# Patient Record
Sex: FEMALE
[De-identification: names, ages, dates, MRNs, and addresses within clinical notes are randomized per-mention and may not be internally consistent; named-entity substitution may affect disease eponyms.]

---

## 2017-08-26 ENCOUNTER — HEALTH MAINTENANCE LETTER (OUTPATIENT)
Age: 57
End: 2017-08-26

## 2017-10-17 ENCOUNTER — TRANSFERRED RECORDS (OUTPATIENT)
Dept: HEALTH INFORMATION MANAGEMENT | Facility: CLINIC | Age: 57
End: 2017-10-17

## 2017-10-18 ENCOUNTER — TRANSFERRED RECORDS (OUTPATIENT)
Dept: HEALTH INFORMATION MANAGEMENT | Facility: CLINIC | Age: 57
End: 2017-10-18

## 2018-01-15 ENCOUNTER — SURGERY (OUTPATIENT)
Age: 58
End: 2018-01-15
Payer: COMMERCIAL

## 2018-01-15 ENCOUNTER — ANESTHESIA (OUTPATIENT)
Dept: SURGERY | Facility: CLINIC | Age: 58
End: 2018-01-15
Payer: COMMERCIAL

## 2018-01-15 ENCOUNTER — ANESTHESIA EVENT (OUTPATIENT)
Dept: SURGERY | Facility: CLINIC | Age: 58
End: 2018-01-15
Payer: COMMERCIAL

## 2018-01-15 ENCOUNTER — HOSPITAL ENCOUNTER (OUTPATIENT)
Facility: CLINIC | Age: 58
Discharge: HOME | End: 2018-01-16
Attending: OBSTETRICS & GYNECOLOGY | Admitting: OBSTETRICS & GYNECOLOGY
Payer: COMMERCIAL

## 2018-01-15 DIAGNOSIS — N81.9 FEMALE GENITAL PROLAPSE, UNSPECIFIED TYPE: Primary | ICD-10-CM

## 2018-01-15 PROCEDURE — 37000009 ZZH ANESTHESIA TECHNICAL FEE, EACH ADDTL 15 MIN: Performed by: OBSTETRICS & GYNECOLOGY

## 2018-01-15 PROCEDURE — 25000128 H RX IP 250 OP 636: Performed by: OBSTETRICS & GYNECOLOGY

## 2018-01-15 PROCEDURE — 25000125 ZZHC RX 250: Performed by: NURSE ANESTHETIST, CERTIFIED REGISTERED

## 2018-01-15 PROCEDURE — 40000275 ZZH STATISTIC RCP TIME EA 10 MIN

## 2018-01-15 PROCEDURE — 25800025 ZZH RX 258: Performed by: OBSTETRICS & GYNECOLOGY

## 2018-01-15 PROCEDURE — 88305 TISSUE EXAM BY PATHOLOGIST: CPT | Mod: 26 | Performed by: OBSTETRICS & GYNECOLOGY

## 2018-01-15 PROCEDURE — 36000056 ZZH SURGERY LEVEL 3 1ST 30 MIN: Performed by: OBSTETRICS & GYNECOLOGY

## 2018-01-15 PROCEDURE — 71000012 ZZH RECOVERY PHASE 1 LEVEL 1 FIRST HR: Performed by: OBSTETRICS & GYNECOLOGY

## 2018-01-15 PROCEDURE — 40000306 ZZH STATISTIC PRE PROC ASSESS II: Performed by: OBSTETRICS & GYNECOLOGY

## 2018-01-15 PROCEDURE — 40000936 ZZH STATISTIC OUTPATIENT (NON-OBS) NIGHT

## 2018-01-15 PROCEDURE — 25000132 ZZH RX MED GY IP 250 OP 250 PS 637: Performed by: OBSTETRICS & GYNECOLOGY

## 2018-01-15 PROCEDURE — 71000013 ZZH RECOVERY PHASE 1 LEVEL 1 EA ADDTL HR: Performed by: OBSTETRICS & GYNECOLOGY

## 2018-01-15 PROCEDURE — 25000128 H RX IP 250 OP 636: Performed by: ANESTHESIOLOGY

## 2018-01-15 PROCEDURE — 27210794 ZZH OR GENERAL SUPPLY STERILE: Performed by: OBSTETRICS & GYNECOLOGY

## 2018-01-15 PROCEDURE — 88305 TISSUE EXAM BY PATHOLOGIST: CPT | Performed by: OBSTETRICS & GYNECOLOGY

## 2018-01-15 PROCEDURE — 36000058 ZZH SURGERY LEVEL 3 EA 15 ADDTL MIN: Performed by: OBSTETRICS & GYNECOLOGY

## 2018-01-15 PROCEDURE — 25000128 H RX IP 250 OP 636: Performed by: NURSE ANESTHETIST, CERTIFIED REGISTERED

## 2018-01-15 PROCEDURE — 40000809 ZZH STATISTIC NO DOCUMENTATION TO SUPPORT CHARGE

## 2018-01-15 PROCEDURE — 25000566 ZZH SEVOFLURANE, EA 15 MIN: Performed by: OBSTETRICS & GYNECOLOGY

## 2018-01-15 PROCEDURE — 40000935 ZZH STATISTIC OUTPATIENT (NON-OBS) EVE

## 2018-01-15 PROCEDURE — T1013 SIGN LANG/ORAL INTERPRETER: HCPCS | Mod: U3

## 2018-01-15 PROCEDURE — 94660 CPAP INITIATION&MGMT: CPT

## 2018-01-15 PROCEDURE — 37000008 ZZH ANESTHESIA TECHNICAL FEE, 1ST 30 MIN: Performed by: OBSTETRICS & GYNECOLOGY

## 2018-01-15 RX ORDER — ONDANSETRON 2 MG/ML
INJECTION INTRAMUSCULAR; INTRAVENOUS PRN
Status: DISCONTINUED | OUTPATIENT
Start: 2018-01-15 | End: 2018-01-15

## 2018-01-15 RX ORDER — ACETAMINOPHEN 500 MG
1000 TABLET ORAL EVERY 6 HOURS
Qty: 100 TABLET | Refills: 0 | COMMUNITY
Start: 2018-01-15

## 2018-01-15 RX ORDER — CELECOXIB 200 MG/1
400 CAPSULE ORAL
Status: DISCONTINUED | OUTPATIENT
Start: 2018-01-15 | End: 2018-01-15 | Stop reason: HOSPADM

## 2018-01-15 RX ORDER — OXYCODONE HYDROCHLORIDE 5 MG/1
TABLET ORAL
Qty: 30 TABLET | Refills: 0 | Status: SHIPPED | OUTPATIENT
Start: 2018-01-15

## 2018-01-15 RX ORDER — ONDANSETRON 4 MG/1
4 TABLET, ORALLY DISINTEGRATING ORAL EVERY 6 HOURS PRN
Status: DISCONTINUED | OUTPATIENT
Start: 2018-01-15 | End: 2018-01-16 | Stop reason: HOSPADM

## 2018-01-15 RX ORDER — NEOSTIGMINE METHYLSULFATE 1 MG/ML
VIAL (ML) INJECTION PRN
Status: DISCONTINUED | OUTPATIENT
Start: 2018-01-15 | End: 2018-01-15

## 2018-01-15 RX ORDER — PROPOFOL 10 MG/ML
INJECTION, EMULSION INTRAVENOUS PRN
Status: DISCONTINUED | OUTPATIENT
Start: 2018-01-15 | End: 2018-01-15

## 2018-01-15 RX ORDER — EPHEDRINE SULFATE 50 MG/ML
INJECTION, SOLUTION INTRAMUSCULAR; INTRAVENOUS; SUBCUTANEOUS PRN
Status: DISCONTINUED | OUTPATIENT
Start: 2018-01-15 | End: 2018-01-15

## 2018-01-15 RX ORDER — GLYCOPYRROLATE 0.2 MG/ML
INJECTION, SOLUTION INTRAMUSCULAR; INTRAVENOUS PRN
Status: DISCONTINUED | OUTPATIENT
Start: 2018-01-15 | End: 2018-01-15

## 2018-01-15 RX ORDER — ONDANSETRON 2 MG/ML
4 INJECTION INTRAMUSCULAR; INTRAVENOUS EVERY 30 MIN PRN
Status: DISCONTINUED | OUTPATIENT
Start: 2018-01-15 | End: 2018-01-15 | Stop reason: HOSPADM

## 2018-01-15 RX ORDER — LIDOCAINE HYDROCHLORIDE 10 MG/ML
INJECTION, SOLUTION INFILTRATION; PERINEURAL PRN
Status: DISCONTINUED | OUTPATIENT
Start: 2018-01-15 | End: 2018-01-15

## 2018-01-15 RX ORDER — PROPOFOL 10 MG/ML
INJECTION, EMULSION INTRAVENOUS CONTINUOUS PRN
Status: DISCONTINUED | OUTPATIENT
Start: 2018-01-15 | End: 2018-01-15

## 2018-01-15 RX ORDER — NALOXONE HYDROCHLORIDE 0.4 MG/ML
.1-.4 INJECTION, SOLUTION INTRAMUSCULAR; INTRAVENOUS; SUBCUTANEOUS
Status: DISCONTINUED | OUTPATIENT
Start: 2018-01-15 | End: 2018-01-16 | Stop reason: HOSPADM

## 2018-01-15 RX ORDER — HYDROMORPHONE HCL/0.9% NACL/PF 0.2MG/0.2
0.2 SYRINGE (ML) INTRAVENOUS
Status: DISCONTINUED | OUTPATIENT
Start: 2018-01-15 | End: 2018-01-16 | Stop reason: HOSPADM

## 2018-01-15 RX ORDER — OXYCODONE HYDROCHLORIDE 5 MG/1
5-10 TABLET ORAL EVERY 4 HOURS PRN
Status: DISCONTINUED | OUTPATIENT
Start: 2018-01-15 | End: 2018-01-16 | Stop reason: HOSPADM

## 2018-01-15 RX ORDER — AMOXICILLIN 250 MG
CAPSULE ORAL
Qty: 60 TABLET | Refills: 1 | COMMUNITY
Start: 2018-01-15

## 2018-01-15 RX ORDER — ONDANSETRON 2 MG/ML
4 INJECTION INTRAMUSCULAR; INTRAVENOUS EVERY 6 HOURS PRN
Status: DISCONTINUED | OUTPATIENT
Start: 2018-01-15 | End: 2018-01-16 | Stop reason: HOSPADM

## 2018-01-15 RX ORDER — PHENAZOPYRIDINE HYDROCHLORIDE 200 MG/1
200 TABLET, FILM COATED ORAL
Status: DISCONTINUED | OUTPATIENT
Start: 2018-01-15 | End: 2018-01-15 | Stop reason: HOSPADM

## 2018-01-15 RX ORDER — HYDRALAZINE HYDROCHLORIDE 20 MG/ML
2.5-5 INJECTION INTRAMUSCULAR; INTRAVENOUS EVERY 10 MIN PRN
Status: DISCONTINUED | OUTPATIENT
Start: 2018-01-15 | End: 2018-01-15 | Stop reason: HOSPADM

## 2018-01-15 RX ORDER — IBUPROFEN 600 MG/1
600 TABLET, FILM COATED ORAL EVERY 6 HOURS
Status: DISCONTINUED | OUTPATIENT
Start: 2018-01-15 | End: 2018-01-16 | Stop reason: HOSPADM

## 2018-01-15 RX ORDER — CEFAZOLIN SODIUM 1 G/3ML
1 INJECTION, POWDER, FOR SOLUTION INTRAMUSCULAR; INTRAVENOUS SEE ADMIN INSTRUCTIONS
Status: DISCONTINUED | OUTPATIENT
Start: 2018-01-15 | End: 2018-01-15 | Stop reason: HOSPADM

## 2018-01-15 RX ORDER — FENTANYL CITRATE 50 UG/ML
INJECTION, SOLUTION INTRAMUSCULAR; INTRAVENOUS PRN
Status: DISCONTINUED | OUTPATIENT
Start: 2018-01-15 | End: 2018-01-15

## 2018-01-15 RX ORDER — ACETAMINOPHEN 325 MG/1
975 TABLET ORAL EVERY 6 HOURS
Status: DISCONTINUED | OUTPATIENT
Start: 2018-01-15 | End: 2018-01-16 | Stop reason: HOSPADM

## 2018-01-15 RX ORDER — MEPERIDINE HYDROCHLORIDE 25 MG/ML
12.5 INJECTION INTRAMUSCULAR; INTRAVENOUS; SUBCUTANEOUS
Status: DISCONTINUED | OUTPATIENT
Start: 2018-01-15 | End: 2018-01-15 | Stop reason: HOSPADM

## 2018-01-15 RX ORDER — CEFAZOLIN SODIUM 2 G/100ML
2 INJECTION, SOLUTION INTRAVENOUS
Status: COMPLETED | OUTPATIENT
Start: 2018-01-15 | End: 2018-01-15

## 2018-01-15 RX ORDER — FENTANYL CITRATE 50 UG/ML
25-50 INJECTION, SOLUTION INTRAMUSCULAR; INTRAVENOUS
Status: DISCONTINUED | OUTPATIENT
Start: 2018-01-15 | End: 2018-01-15 | Stop reason: HOSPADM

## 2018-01-15 RX ORDER — ONDANSETRON 4 MG/1
4 TABLET, ORALLY DISINTEGRATING ORAL EVERY 30 MIN PRN
Status: DISCONTINUED | OUTPATIENT
Start: 2018-01-15 | End: 2018-01-15 | Stop reason: HOSPADM

## 2018-01-15 RX ORDER — IBUPROFEN 200 MG
TABLET ORAL
COMMUNITY
Start: 2018-01-15

## 2018-01-15 RX ORDER — LABETALOL HYDROCHLORIDE 5 MG/ML
10 INJECTION, SOLUTION INTRAVENOUS
Status: DISCONTINUED | OUTPATIENT
Start: 2018-01-15 | End: 2018-01-15 | Stop reason: HOSPADM

## 2018-01-15 RX ORDER — NALOXONE HYDROCHLORIDE 0.4 MG/ML
.1-.4 INJECTION, SOLUTION INTRAMUSCULAR; INTRAVENOUS; SUBCUTANEOUS
Status: DISCONTINUED | OUTPATIENT
Start: 2018-01-15 | End: 2018-01-15 | Stop reason: HOSPADM

## 2018-01-15 RX ORDER — SODIUM CHLORIDE, SODIUM LACTATE, POTASSIUM CHLORIDE, CALCIUM CHLORIDE 600; 310; 30; 20 MG/100ML; MG/100ML; MG/100ML; MG/100ML
INJECTION, SOLUTION INTRAVENOUS CONTINUOUS
Status: DISCONTINUED | OUTPATIENT
Start: 2018-01-15 | End: 2018-01-15 | Stop reason: HOSPADM

## 2018-01-15 RX ORDER — SODIUM CHLORIDE, SODIUM LACTATE, POTASSIUM CHLORIDE, CALCIUM CHLORIDE 600; 310; 30; 20 MG/100ML; MG/100ML; MG/100ML; MG/100ML
INJECTION, SOLUTION INTRAVENOUS CONTINUOUS PRN
Status: DISCONTINUED | OUTPATIENT
Start: 2018-01-15 | End: 2018-01-15

## 2018-01-15 RX ORDER — DEXAMETHASONE SODIUM PHOSPHATE 4 MG/ML
INJECTION, SOLUTION INTRA-ARTICULAR; INTRALESIONAL; INTRAMUSCULAR; INTRAVENOUS; SOFT TISSUE PRN
Status: DISCONTINUED | OUTPATIENT
Start: 2018-01-15 | End: 2018-01-15

## 2018-01-15 RX ORDER — ACETAMINOPHEN 325 MG/1
975 TABLET ORAL ONCE
Status: COMPLETED | OUTPATIENT
Start: 2018-01-15 | End: 2018-01-15

## 2018-01-15 RX ORDER — GABAPENTIN 600 MG/1
600 TABLET ORAL
Status: DISCONTINUED | OUTPATIENT
Start: 2018-01-15 | End: 2018-01-15 | Stop reason: HOSPADM

## 2018-01-15 RX ADMIN — DEXAMETHASONE SODIUM PHOSPHATE 4 MG: 4 INJECTION, SOLUTION INTRA-ARTICULAR; INTRALESIONAL; INTRAMUSCULAR; INTRAVENOUS; SOFT TISSUE at 09:39

## 2018-01-15 RX ADMIN — Medication 3 MG: at 12:25

## 2018-01-15 RX ADMIN — HYDROMORPHONE HYDROCHLORIDE 0.5 MG: 1 INJECTION, SOLUTION INTRAMUSCULAR; INTRAVENOUS; SUBCUTANEOUS at 10:18

## 2018-01-15 RX ADMIN — SODIUM CHLORIDE, POTASSIUM CHLORIDE, SODIUM LACTATE AND CALCIUM CHLORIDE: 600; 310; 30; 20 INJECTION, SOLUTION INTRAVENOUS at 09:35

## 2018-01-15 RX ADMIN — GLYCOPYRROLATE 0.4 MG: 0.2 INJECTION, SOLUTION INTRAMUSCULAR; INTRAVENOUS at 12:25

## 2018-01-15 RX ADMIN — SODIUM CHLORIDE 200 ML: 900 IRRIGANT IRRIGATION at 12:38

## 2018-01-15 RX ADMIN — FENTANYL CITRATE 50 MCG: 50 INJECTION, SOLUTION INTRAMUSCULAR; INTRAVENOUS at 10:02

## 2018-01-15 RX ADMIN — ACETAMINOPHEN 975 MG: 325 TABLET, FILM COATED ORAL at 18:11

## 2018-01-15 RX ADMIN — Medication 10 MG: at 10:44

## 2018-01-15 RX ADMIN — ONDANSETRON 4 MG: 2 INJECTION INTRAMUSCULAR; INTRAVENOUS at 12:25

## 2018-01-15 RX ADMIN — ROCURONIUM BROMIDE 10 MG: 10 INJECTION INTRAVENOUS at 10:21

## 2018-01-15 RX ADMIN — FENTANYL CITRATE 50 MCG: 50 INJECTION, SOLUTION INTRAMUSCULAR; INTRAVENOUS at 12:26

## 2018-01-15 RX ADMIN — PROPOFOL 75 MCG/KG/MIN: 10 INJECTION, EMULSION INTRAVENOUS at 09:45

## 2018-01-15 RX ADMIN — Medication 0.5 MG: at 13:47

## 2018-01-15 RX ADMIN — CEFAZOLIN SODIUM 2 G: 2 INJECTION, SOLUTION INTRAVENOUS at 09:35

## 2018-01-15 RX ADMIN — ROCURONIUM BROMIDE 40 MG: 10 INJECTION INTRAVENOUS at 09:39

## 2018-01-15 RX ADMIN — IBUPROFEN 600 MG: 600 TABLET ORAL at 18:11

## 2018-01-15 RX ADMIN — GABAPENTIN 600 MG: 600 TABLET, FILM COATED ORAL at 08:51

## 2018-01-15 RX ADMIN — PHENAZOPYRIDINE HYDROCHLORIDE 200 MG: 200 TABLET ORAL at 08:51

## 2018-01-15 RX ADMIN — MIDAZOLAM 2 MG: 1 INJECTION INTRAMUSCULAR; INTRAVENOUS at 09:35

## 2018-01-15 RX ADMIN — Medication 0.5 MG: at 15:22

## 2018-01-15 RX ADMIN — FENTANYL CITRATE 50 MCG: 50 INJECTION, SOLUTION INTRAMUSCULAR; INTRAVENOUS at 09:39

## 2018-01-15 RX ADMIN — OXYCODONE HYDROCHLORIDE 10 MG: 5 TABLET ORAL at 20:10

## 2018-01-15 RX ADMIN — PROPOFOL 150 MG: 10 INJECTION, EMULSION INTRAVENOUS at 09:39

## 2018-01-15 RX ADMIN — CELECOXIB 400 MG: 200 CAPSULE ORAL at 08:51

## 2018-01-15 RX ADMIN — GLYCOPYRROLATE 0.2 MG: 0.2 INJECTION, SOLUTION INTRAMUSCULAR; INTRAVENOUS at 09:39

## 2018-01-15 RX ADMIN — ACETAMINOPHEN 975 MG: 325 TABLET, FILM COATED ORAL at 08:51

## 2018-01-15 RX ADMIN — SODIUM CHLORIDE, POTASSIUM CHLORIDE, SODIUM LACTATE AND CALCIUM CHLORIDE: 600; 310; 30; 20 INJECTION, SOLUTION INTRAVENOUS at 10:46

## 2018-01-15 RX ADMIN — LIDOCAINE HYDROCHLORIDE 40 MG: 10 INJECTION, SOLUTION INFILTRATION; PERINEURAL at 09:39

## 2018-01-15 RX ADMIN — HYDROMORPHONE HYDROCHLORIDE 0.5 MG: 1 INJECTION, SOLUTION INTRAMUSCULAR; INTRAVENOUS; SUBCUTANEOUS at 10:02

## 2018-01-15 NOTE — OR NURSING
Unable to start capnography in PACU.  Pt on C pap with nasal mask through out PACU stay.  O2 saturations remain > 95%.

## 2018-01-15 NOTE — PLAN OF CARE
Problem: Patient Care Overview  Goal: Plan of Care/Patient Progress Review  PRIMARY DIAGNOSIS: Vag Hyst  OUTPATIENT/OBSERVATION GOALS TO BE MET BEFORE DISCHARGE:  1. Stable vital signs Temp 99.9, 93% 2L per NC, otherwise VSS  2. Tolerating diet: patient has not eaten, water tolerated, denies nausea   3. Pain controlled with oral pain medications:  patient declined pain medications at this time  4. Positive bowel sounds:  Yes, hypoactive  5. Voiding without difficulty:  No, beverly in place  6. Able to ambulate: no, patient recently arrived to floor from PACU  7. Provider specific discharge goals met:  No    Discharge Planner Nurse   Safe discharge environment identified: Yes  Barriers to discharge: Yes       Entered by: Nieves Mansfield 01/15/2018 5:03 PM     Please review provider order for any additional goals.   Nurse to notify provider when observation goals have been met and patient is ready for discharge.

## 2018-01-15 NOTE — ANESTHESIA POSTPROCEDURE EVALUATION
Patient: Mandy Martinez    Procedure(s):  Vaginal Hysterectomy,  Anterior and Posterior vaginal repairs with a cystoscopy - Wound Class: II-Clean Contaminated   - Wound Class: II-Clean Contaminated    Diagnosis:Pelvic Organ Prolapse   Diagnosis Additional Information: January 15, 2018  Surgeon: Sunita Taylor DO  Assistant: Percy Manley MD     Procedure:   1. Vaginal hysterectomy  2. Hernandez Durham culdoplasty, repair of enterocele  3. Intraperitoneal colpopexy utilizing uterosacral ligaments bilaterally  4.,  Anterior colporrhaphy  5. Posterior colpoperineorrhaphy  6.  Cystoscopy     Preoperative diagnosis:    1. Symptomatic pelvic organ prolapse  Postoperative diagnosis:    1. Symptomatic pelvic organ prolapse  Anesthesia: General  Findings: Stage 2 pel, jessica organ prolapse, bilateral ureteral spill at conclusion of procedure, bladder free of injury or foreign body   Complications: none   Estimated Blood Loss: 250 cc  Drains/Packing: Transurethral Forrester catheter        Anesthesia Type:  General, ETT    Note:  Anesthesia Post Evaluation    Patient location during evaluation: PACU  Patient participation: Able to fully participate in evaluation  Level of consciousness: awake  Pain management: adequate  Airway patency: patent  Cardiovascular status: acceptable  Respiratory status: acceptable  Hydration status: acceptable  PONV: none     Anesthetic complications: None          Last vitals:  Vitals:    01/15/18 1255 01/15/18 1300 01/15/18 1315   BP: 121/79 121/78 102/66   Pulse:      Resp: 15 12 12   Temp:      SpO2: 100% 100% 96%         Electronically Signed By: Jonah Mercer MD  January 15, 2018  1:21 PM

## 2018-01-15 NOTE — PHARMACY-ADMISSION MEDICATION HISTORY
Admission medication history interview status for this patient is complete. See [facility] admission navigator for allergy information, prior to admission medications and immunization status.     Medication history interview source(s):Patient and Family  Medication history resources (including written lists, pill bottles, clinic record):None  Primary pharmacy:-    Changes made to PTA medication list:  Added: -  Deleted: patanol eye gtts  Changed: -    Actions taken by pharmacist (provider contacted, etc):None     Additional medication history information:None    Medication reconciliation/reorder completed by provider prior to medication history? No    Do you take OTC medications (eg tylenol, ibuprofen, fish oil, eye/ear drops, etc)? no(Y/N)    For patients on insulin therapy: no (Y/N)  Lantus/levemir/NPH/Mix 70/30 dose:   (Y/N) (see Med list for doses)   Sliding scale Novolog Y/N  If Yes, do you have a baseline novolog pre-meal dose:  units with meals  Patients eat three meals a day:   Y/N    How many episodes of hypoglycemia do you have per week: _______  How many missed doses do you have per week: ______  How many times do you check your blood glucose per day: _______   Any Barriers to therapy - Be specific :  cost of medications, comfortable with giving injections (if applicable), comfortable and confident with current diabetes regimen: Y/N ______________      Prior to Admission medications    Medication Sig Last Dose Taking? Auth Provider   acetaminophen (TYLENOL) 500 MG tablet Take 2 tablets (1,000 mg) by mouth every 6 hours  Yes Sunita Taylor DO   ibuprofen (ADVIL/MOTRIN) 200 MG tablet Take 3 tablets (600 mg) every 6 hours  Yes Sunita Taylor DO   senna-docusate (SENOKOT-S;PERICOLACE) 8.6-50 MG per tablet Take 2 tablets at bedtime every night. Only hold for diarrhea  Yes Sunita Taylor DO   oxyCODONE IR (ROXICODONE) 5 MG tablet Take 5 mg for pain 4-7, take 10 mg for pain 8-10 that  is uncontrolled with acetaminophen and ibuprofen.  Yes Sunita Taylor, DO   CIPROFLOXACIN PO Take 500 mg by mouth 2 times daily Take for 10 days (1/8/18-1/18/18) not started  Reported, Patient

## 2018-01-15 NOTE — DISCHARGE SUMMARY
Physician Discharge Summary     Patient ID:  Mandy Martinez  [id number]  57 year old  1960    Admit date: 1/15/2018    Discharge date and time: 1/16/2018     Admitting Physician: Sunita Taylor DO     Discharge Physician: Sunita Taylor DO    Admission Diagnoses: Pelvic Organ Prolapse     Discharge Diagnoses: same    Admission Condition: good    Discharged Condition: good    Indication for Admission: surgical correction of pelvic organ prolapse and postoperative cares    Hospital Course: Routine postoperative care and voiding trial.     Consults: none    Significant Diagnostic Studies: postoperative labs:     Recent Labs  Lab 01/16/18  0610   HGB 12.3     Creatinine   Date Value Ref Range Status   01/16/2018 0.62 0.52 - 1.04 mg/dL Final     Treatments: Surgery: Vaginal hysterectomy, anterior colporrhaphy, posterior colpoperineorrhaphy, cystoscopy    Discharge Exam:  Stable examination, same as progress note dated today    Disposition: home    Patient Instructions:   Current Discharge Medication List      START taking these medications    Details   acetaminophen (TYLENOL) 500 MG tablet Take 2 tablets (1,000 mg) by mouth every 6 hours  Qty: 100 tablet, Refills: 0    Associated Diagnoses: Female genital prolapse, unspecified type      ibuprofen (ADVIL/MOTRIN) 200 MG tablet Take 3 tablets (600 mg) every 6 hours    Associated Diagnoses: Female genital prolapse, unspecified type      senna-docusate (SENOKOT-S;PERICOLACE) 8.6-50 MG per tablet Take 2 tablets at bedtime every night. Only hold for diarrhea  Qty: 60 tablet, Refills: 1    Associated Diagnoses: Female genital prolapse, unspecified type      oxyCODONE IR (ROXICODONE) 5 MG tablet Take 5 mg for pain 4-7, take 10 mg for pain 8-10 that is uncontrolled with acetaminophen and ibuprofen.  Qty: 30 tablet, Refills: 0    Associated Diagnoses: Female genital prolapse, unspecified type         CONTINUE these medications which have NOT CHANGED     Details   CIPROFLOXACIN PO Take 500 mg by mouth 2 times daily Take for 10 days (1/8/18-1/18/18)      PATANOL 0.1 % OP SOLN None Entered           General:  Apply ice over your incision (not directly on the skin) as needed to ease   any discomfort. Avoid rapid movements for 24 hours. You may feel dizzy after the procedure. Take care when cooking or using electrical devices. Do not apply creams, lotions, powders or hydrogen peroxide to your incision.  Keep any incisions dry and open to the air as much as possible.   Do not make any major decisions, such as signing important papers or managing legal issues, for 24 hours.   If you were sent home with a catheter and did not receive an antibiotic, contact [facility].    Cleaning: Showering is preferred, no swimming, bathtubs or soaking. You may need a   feminine pad for up to 6 weeks after surgery, bloody/yellow/white vaginal discharge is normal.     Postoperative restrictions  1.  No heavy lifting greater than 10 pounds or strenuous exercise for 6 weeks.  2.  Pelvic rest.  No douching, tampons, or intercourse for 6 weeks (estrogen vaginal cream is OK to use if prescribed)  3.  No driving while on narcotics.  Driving may be resumed initially with a competent passenger one to two weeks after surgery if no longer taking narcotics.  4.  No exercising during this period, stairs are okay.     Bowel Management: Keep your stools soft and regular, I recommend Milk of Magnesia 30ml daily for 1 to 2 weeks after surgery. Metamucil and Miralax are also very effective and should be used for up to six months after surgery(or longer).    Urinary/Bladder Management: When urinating, do not bear down, relax and allow the bladder muscle to contract. If you are unable to void, please contact me. If you go home   with a catheter, you may be given an antibiotic tablet to take before bed to retard infection.  If you were sent home with a catheter and did not receive an antibiotic, contact  the [facility].    Medications  -For moderate pain control, please use Tylenol (acetaminophen) and Motrin (ibuprofen).  -For more severe pain please use oxycodone 5 mg 1-2 every four hours as needed.  -If you are prescribed estrogen vaginal cream, please use at bedtime 2-3 times weekly until your postoperative visit.  -You may use MiraLAX, Senokot S, or Milk of Magnesia to help keep your bowels soft and regular.    Symptoms to call for  Patient has been instructed to call if a temperature greater than 101 degrees Fahrenheit, protracted nausea, vomiting, heavy vaginal bleeding greater than one pad an hour, signs or symptoms of infection, inability to urinate, or if there are questions or concerns.      Follow-up: in the clinic as instructed to have the catheter removed if you are sent home with one (the catheter is usually removed in a week unless instructed to keep it in longer).  If you have gone home without a catheter, then you will need to follow up 2 weeks from your surgery.       Signed:  Sunita Taylor  1/15/2018  9:25 AM

## 2018-01-15 NOTE — INTERVAL H&P NOTE
I reviewed her history and the planned procedure with her using a professional . There are no changes.

## 2018-01-15 NOTE — ANESTHESIA PREPROCEDURE EVALUATION
Anesthesia Evaluation     . Pt has had prior anesthetic.            ROS/MED HX    ENT/Pulmonary:     (+)sleep apnea, , . .    Neurologic:       Cardiovascular:         METS/Exercise Tolerance:     Hematologic:         Musculoskeletal:   (+) arthritis, , , -       GI/Hepatic:         Renal/Genitourinary:         Endo:         Psychiatric:         Infectious Disease:         Malignancy:         Other:                     Physical Exam  Normal systems: cardiovascular and pulmonary    Airway   Mallampati: II  TM distance: >3 FB  Neck ROM: full    Dental     Cardiovascular       Pulmonary                     Anesthesia Plan      History & Physical Review  History and physical reviewed and following examination; no interval change.    ASA Status:  2 .    NPO Status:  > 8 hours    Plan for General and ETT with Intravenous and Propofol induction. Maintenance will be Balanced.    PONV prophylaxis:  Ondansetron (or other 5HT-3) and Dexamethasone or Solumedrol       Postoperative Care  Postoperative pain management:  IV analgesics.      Consents  Anesthetic plan, risks, benefits and alternatives discussed with:  Patient.  Use of blood products discussed: Yes.   .                          .

## 2018-01-15 NOTE — PROGRESS NOTES
ROOM # 231    Living Situation (if not independent, order SW consult): Patient lives independently with daughter.  Facility name: N/A  : patient declines to offer     Activity level at baseline: Independent  Activity level on admit: Patient to floor from PACU, lethargic, will assess.        Patient registered to observation; given Patient Bill of Rights; given the opportunity to ask questions about observation status and their plan of care.  Patient has been oriented to the observation room, bathroom and call light is in place.    Discussed discharge goals and expectations with patient/family.

## 2018-01-15 NOTE — IP AVS SNAPSHOT
MRN:[id number]                      After Visit Summary   1/15/2018    Mandy Martinez    MRN: [id number]           Thank you!     Thank you for choosing [facility] for your care. Our goal is always to provide you with excellent care. Hearing back from our patients is one way we can continue to improve our services. Please take a few minutes to complete the written survey that you may receive in the mail after you visit. If you would like to speak to someone directly about your visit please contact Patient Relations at [phone]. Thank you!          Patient Information     Date Of Birth          1960        About your hospital stay     You were admitted on:  January 15, 2018 You last received care in the:  [facility] Observation Department    You were discharged on:  January 16, 2018       Who to Call     For medical emergencies, please call 911.  For non-urgent questions about your medical care, please call your primary care provider or clinic, [phone]  For questions related to your surgery, please call your surgery clinic        Attending Provider     Provider Sunita Villeda,  OB/Gyn       Primary Care Provider Office Phone # Fax #    Mandy Sahni PA-C [phone]      After Care Instructions     Discharge Instructions       General:  Apply ice over your incision (not directly on the skin) as needed to ease   any discomfort. Avoid rapid movements for 24 hours. You may feel dizzy after the procedure. Take care when cooking or using electrical devices. Do not apply creams, lotions, powders or hydrogen peroxide to your incision.  Keep any incisions dry and open to the air as much as possible.   Do not make any major decisions, such as signing important papers or managing legal issues, for 24 hours.   If you were sent home with a catheter and did not receive an antibiotic, contact [facility].    Cleaning: Showering  is preferred, no swimming, bathtubs or soaking. You may need a   feminine pad for up to 6 weeks after surgery, bloody/yellow/white vaginal discharge is normal.     Postoperative restrictions  1.  No heavy lifting greater than 10 pounds or strenuous exercise for 6 weeks.  2.  Pelvic rest.  No douching, tampons, or intercourse for 6 weeks (estrogen vaginal cream is OK to use if prescribed)  3.  No driving while on narcotics.  Driving may be resumed initially with a competent passenger one to two weeks after surgery if no longer taking narcotics.  4.  No exercising during this period, stairs are okay.     Bowel Management: Keep your stools soft and regular, I recommend Milk of Magnesia 30ml daily for 1 to 2 weeks after surgery. Metamucil and Miralax are also very effective and should be used for up to six months after surgery(or longer).    Urinary/Bladder Management: When urinating, do not bear down, relax and allow the bladder muscle to contract. If you are unable to void, please contact me. If you go home   with a catheter, you may be given an antibiotic tablet to take before bed to retard infection.  If you were sent home with a catheter and did not receive an antibiotic, contact the [facility] Nurse.    Medications  -For moderate pain control, please use Tylenol (acetaminophen) and Motrin (ibuprofen).  -For more severe pain please use oxycodone 5 mg 1-2 every four hours as needed.  -If you are prescribed estrogen vaginal cream, please use at bedtime 2-3 times weekly until your postoperative visit.  -You may use MiraLAX, Senokot S, or Milk of Magnesia to help keep your bowels soft and regular.    Symptoms to call for  Patient has been instructed to call if a temperature greater than 101 degrees Fahrenheit, protracted nausea, vomiting, heavy vaginal bleeding greater than one pad an hour, signs or symptoms of infection, inability to urinate, or if there are questions or concerns.      Follow-up: in the clinic as  "instructed to have the catheter removed if you are sent home with one (the catheter is usually removed in a week unless instructed to keep it in longer).  If you have gone home without a catheter, then you will need to follow up 2 weeks from your surgery.                             Pending Results     Date and Time Order Name Status Description    1/15/2018 1214 Surgical pathology exam In process             Admission Information     Date & Time Provider Department Dept. Phone    1/15/2018 Sunita Taylor,  [facility] Observation Department [phone]      Your Vitals Were     Blood Pressure Pulse Temperature Respirations Height Weight    104/54 (BP Location: Left arm) 59 97.5  F (36.4  C) (Oral) 18 1.575 m (5' 2\") 65.8 kg (145 lb)    Pulse Oximetry BMI (Body Mass Index)                92% 26.52 kg/m2          [employer] Information     [employer] lets you send messages to your doctor, view your test results, renew your prescriptions, schedule appointments and more. To sign up, go to [email or web address] . Click on \"Log in\" on the left side of the screen, which will take you to the Welcome page. Then click on \"Sign up Now\" on the right side of the page.     You will be asked to enter the access code listed below, as well as some personal information. Please follow the directions to create your username and password.     Your access code is: [email or web address]  Expires: 2018 10:52 AM     Your access code will  in 90 days. If you need help or a new code, please call your [facility] clinic or [phone].        Care EveryWhere ID     This is your Care EveryWhere ID. This could be used by other organizations to access your [facility] medical records  [id number]        Equal Access to Services     HIWOT JACOME : Kennedy Rice, kayyln mayes, hero guerrero. So [facility] [phone].    ATENCIÓN: Si pushpala español, jacquie a marcus " disposición servicios gratuitos de asistencia lingüística. Melchor silver [phone].    We comply with applicable federal civil rights laws and [place] laws. We do not discriminate on the basis of race, color, national origin, age, disability, sex, sexual orientation, or gender identity.               Review of your medicines      START taking        Dose / Directions    acetaminophen 500 MG tablet   Commonly known as:  TYLENOL   Used for:  Female genital prolapse, unspecified type        Dose:  1000 mg   Take 2 tablets (1,000 mg) by mouth every 6 hours   Quantity:  100 tablet   Refills:  0       ibuprofen 200 MG tablet   Commonly known as:  ADVIL/MOTRIN   Used for:  Female genital prolapse, unspecified type        Take 3 tablets (600 mg) every 6 hours   Refills:  0       nitroFURantoin (macrocrystal-monohydrate) 100 MG capsule   Commonly known as:  MACROBID   Used for:  Female genital prolapse, unspecified type        Dose:  100 mg   Take 1 capsule (100 mg) by mouth At Bedtime Only take while you have the Forrester bladder catheter in   Quantity:  20 capsule   Refills:  0       oxyCODONE IR 5 MG tablet   Commonly known as:  ROXICODONE   Used for:  Female genital prolapse, unspecified type        Take 5 mg for pain 4-7, take 10 mg for pain 8-10 that is uncontrolled with acetaminophen and ibuprofen.   Quantity:  30 tablet   Refills:  0       senna-docusate 8.6-50 MG per tablet   Commonly known as:  SENOKOT-S;PERICOLACE   Used for:  Female genital prolapse, unspecified type        Take 2 tablets at bedtime every night. Only hold for diarrhea   Quantity:  60 tablet   Refills:  1         CONTINUE these medicines which have NOT CHANGED        Dose / Directions    CIPROFLOXACIN PO        Dose:  500 mg   Take 500 mg by mouth 2 times daily Take for 10 days (1/8/18-1/18/18)   Refills:  0            Where to get your medicines      These medications were sent to [place], [place] - [address]  [address] [facility] [address]     Phone:  [phone]     nitroFURantoin (macrocrystal-monohydrate) 100 MG capsule         Some of these will need a paper prescription and others can be bought over the counter. Ask your nurse if you have questions.     Bring a paper prescription for each of these medications     oxyCODONE IR 5 MG tablet       You don't need a prescription for these medications     acetaminophen 500 MG tablet    ibuprofen 200 MG tablet    senna-docusate 8.6-50 MG per tablet               ANTIBIOTIC INSTRUCTION     You've Been Prescribed an Antibiotic - Now What?  Your healthcare team thinks that you or your loved one might have an infection. Some infections can be treated with antibiotics, which are powerful, life-saving drugs. Like all medications, antibiotics have side effects and should only be used when necessary. There are some important things you should know about your antibiotic treatment.      Your healthcare team may run tests before you start taking an antibiotic.    Your team may take samples (e.g., from your blood, urine or other areas) to run tests to look for bacteria. These test can be important to determine if you need an antibiotic at all and, if you do, which antibiotic will work best.      Within a few days, your healthcare team might change or even stop your antibiotic.    Your team may start you on an antibiotic while they are working to find out what is making you sick.    Your team might change your antibiotic because test results show that a different antibiotic would be better to treat your infection.    In some cases, once your team has more information, they learn that you do not need an antibiotic at all. They may find out that you don't have an infection, or that the antibiotic you're taking won't work against your infection. For example, an infection caused by a virus can't be treated with antibiotics. Staying on an antibiotic when you don't need it  is more likely to be harmful than helpful.      You may experience side effects from your antibiotic.    Like all medications, antibiotics have side effects. Some of these can be serious.    Let you healthcare team know if you have any known allergies when you are admitted to the hospital.    One significant side effect of nearly all antibiotics is the risk of severe and sometimes deadly diarrhea caused by Clostridium difficile (C. Difficile). This occurs when a person takes antibiotics because some good germs are destroyed. Antibiotic use allows C. diificile to take over, putting patients at high risk for this serious infection.    As a patient or caregiver, it is important to understand your or your loved one's antibiotic treatment. It is especially important for caregivers to speak up when patients can't speak for themselves. Here are some important questions to ask your healthcare team.    What infection is this antibiotic treating and how do you know I have that infection?    What side effects might occur from this antibiotic?    How long will I need to take this antibiotic?    Is it safe to take this antibiotic with other medications or supplements (e.g., vitamins) that I am taking?     Are there any special directions I need to know about taking this antibiotic? For example, should I take it with food?    How will I be monitored to know whether my infection is responding to the antibiotic?    What tests may help to make sure the right antibiotic is prescribed for me?      Information provided by:  www.cdc.gov/getsmart  U.S. Department of Health and Human Services  Centers for disease Control and Prevention  National Center for Emerging and Zoonotic Infectious Diseases  Division of Healthcare Quality Promotion         Protect others around you: Learn how to safely use, store and throw away your medicines at www.disposemymeds.org.             Medication List: This is a list of all your medications and when to  take them. Check marks below indicate your daily home schedule. Keep this list as a reference.      Medications           Morning Afternoon Evening Bedtime As Needed    acetaminophen 500 MG tablet   Commonly known as:  TYLENOL   Take 2 tablets (1,000 mg) by mouth every 6 hours   Last time this was given:  975 mg on 1/16/2018  1:28 PM                                CIPROFLOXACIN PO   Take 500 mg by mouth 2 times daily Take for 10 days (1/8/18-1/18/18)                                ibuprofen 200 MG tablet   Commonly known as:  ADVIL/MOTRIN   Take 3 tablets (600 mg) every 6 hours   Last time this was given:  600 mg on 1/16/2018  6:03 AM                                nitroFURantoin (macrocrystal-monohydrate) 100 MG capsule   Commonly known as:  MACROBID   Take 1 capsule (100 mg) by mouth At Bedtime Only take while you have the Forrester bladder catheter in                                oxyCODONE IR 5 MG tablet   Commonly known as:  ROXICODONE   Take 5 mg for pain 4-7, take 10 mg for pain 8-10 that is uncontrolled with acetaminophen and ibuprofen.   Last time this was given:  5 mg on 1/16/2018  4:59 PM                                senna-docusate 8.6-50 MG per tablet   Commonly known as:  SENOKOT-S;PERICOLACE   Take 2 tablets at bedtime every night. Only hold for diarrhea                                          More Information        Cuidado De La Sonda De Forrester [Forrester Catheter Care]    Dilia sonda de Forrester es un tubo de goma que se inserta en la uretra (el orificio por el que sale la orina) hasta la vejiga. Eso ayuda a eliminar la orina de la vejiga. Hay un pequeño globo en el extremo del tubo que se infla dilia vez que el tubo fue insertado. Eso impide que la sonda se salga de la vejiga.  Dilia sonda de Forrester se usa para tratar la retención urinaria (urinary retention) [cuando la persona no puede orinar]. También se la utiliza cuando hay incontinencia (incontinence) [pérdida del control de la vejiga].  Cuidados En La  Casa:  1. Termine de mae el antibiótico (antibiotic) que le hayan recetado incluso aunque se sienta mejor antes de terminarlo.  2. Es importante evitar que las bacterias ingresen en la bolsa colectora. No desconecte la sonda de la bolsa colectora.  3. Emplee deisy alcaraz en la pierna para sujetar el tubo de vaciado, de modo alhaji que no tire de la sonda. Cuando la bolsa colectora esté llena, vacíela empleando la boquilla de vaciado que se encuentra en la parte inferior de la bolsa.  4. No tire de la sonda ni intente quitársela. Se lastimará la uretra si lo hace. Tiene que quitársela un médico o deisy enfermera.  Visita De Control:  Programe deisy visita de control con marcus médico, o según le hayan indicado, para que le marcelina otra prueba de orina (urine testing) y para que le cambien o le quiten la sonda.  Busque Prontamente Atención Médica  si algo de lo siguiente ocurre:    Fiebre de 100.4 F (38 C) o más rebekah, o segundo le haya indicado marcus proveedor de atención médica.    Dolor en la vejiga o sensación de que  está llena .    Hinchazón abdominal, náuseas o vómito, o dolor en la espalda.    Alhaji o pérdida de orina alrededor de la sonda.    Alhaji en la orina que sale de la sonda (si es un síntoma nuevo).    La sonda se sale.    La sonda chase de drenar jorge 6 horas.    Debilidad, mareo o desmayo.  Date Last Reviewed: 3/10/2014    2650-6708 [employer]. [address] [place] [address], [place] [address]. Todos los derechos reservados. Esta información no pretende sustituir la atención médica profesional. Sólo marcus médico puede diagnosticar y tratar un problema de sergio.                Instrucciones de rebekah catéter urinario permanente   Usted ha sido dado de rebekah con un catéter urinario permanente (también llamado sonda o catéter de Ofrrester). Un catéter es un tubo haney y flexible. Un catéter urinario permanente tiene dos partes. La primera parte es un tubo que drena la orina de marcus vejiga. La segunda parte es deisy bolsa u  otro dispositivo que recoge la orina.  Lo más importante es recordar que usted debe prevenir deisy infección. Lávese siempre las giana antes de tocar la bolsa o los tubos de marcus catéter.  Cuidados en la casa  Cómo drenar la bolsa:    Lávese las giana.    Agarre el tubo de drenaje y ubíquelo de manera que quede por encima de un inodoro o de un recipiente de medición.    Afloje la pinza o abrazadera del tubo.    No toque la punta del tubo de drenaje ni deje que toque el inodoro o el recipiente.  Limpieza del tubo de drenaje:    Cuando la bolsa esté vacía, limpie la punta del tubo de drenaje con deisy toallita impregnada en alcohol.    Apriete la pinza o abrazadera sobre el tubo.    Vuelva a insertar el tubo en el hueco de la bolsa de drenaje.  Cómo limpiar marcus piel y los tubos:    Limpie la piel cercana al catéter con jabón y agua.    Lávese el área genital de adelante hacia atrás.    Lave los tubos del catéter. Siempre lave el catéter en dirección hacia afuera de marcus cuerpo.    Se le dirá cuándo y cómo cambiar marcus bolsa y kya tubos.    No trate de quitarse el catéter usted solo.    Usted puede bañarse con el catéter permanente.  Cómo vaciar deisy bolsa pegada a la pierna:    Lávese las giana.    Quite el tapón de la bolsa.    Drene la bolsa en el inodoro o en un recipiente medidor.  No permita que la punta del tubo de drenaje toque ningún objeto, incluso kya dedos.    Limpie la punta del tubo de drenaje con deisy toallita impregnada en alcohol.    Ponga de nuevo el tapón.  Visitas de control    Programe deisy visita de control según le indique el personal médico.     Cuándo debe llamar al médico  Llame al médico inmediatamente si usted presenta alguno de estos síntomas:    Fiebre de más de 100.0  F    Escalofríos    Escape de líquido en el área de inserción del catéter    Espasmos que aumentan (contracciones incontrolables) de las piernas, el abdomen o la vejiga (los espasmos ocasionales y leves son normales)    Sensación de ardor  en las vías urinarias, el pene o el área genital    Náuseas y vómito    Dolor en la parte inferior de la espalda    Orina turbia    Sedimento o moco en la orina    Orina con izzy (rosada o jeremías) o maloliente   Date Last Reviewed: 9/24/2014 2000-2017 [employer]. [address] [place] [address]. Todos los derechos reservados. Esta información no pretende sustituir la atención médica profesional. Sólo marcus médico puede diagnosticar y tratar un problema de sergio.                Instrucciones de rebekah: cómo cuidar marcus bolsa de pierna  Usted se va a marcus casa con un catéter urinario y un dispositivo recolector (bolsa de drenaje) puestos. Deisy clase de dispositivo recolector se llama bolsa de pierna. Deisy bolsa de pierna es deisy bolsa pequeña de drenaje que usted puede usar en marcus pierna jorge el día para recolectar la orina. La bolsa se puede colocar debajo de la ropa. Usted se puede  con mucha facilidad cuando usa deisy bolsa de pierna en lugar de deisy bolsa recolectora más oly.  A usted le mostraron cómo cuidar marcus catéter en el hospital. Esta hoja de información le ayudará a recordar esos pasos cuando usted esté en marcus casa.  Cuidados en la casa    Lávese muy vernon las giana antes y después de manipular marcus catéter o marcus dispositivo recolector.    Reúna los elementos necesarios:    Toallitas impregnadas en alcohol    Agua y jabón    Toalla y paño para lavarse    Alcaraz de sujeción para la pierna y bolsa de pierna    Use agua y jabón para lavarse la nayan donde el catéter entra en marcus cuerpo. Enjuáguese vernon.    Fije el sostenedor de la bolsa a marcus pierna:    Coloque la alcaraz de la pierna en la parte superior del muslo con la etiqueta del producto hacia afuera de marcus pierna.    Estire la alcaraz de la pierna hasta que quede vernon puesta y ajústela.    Coloque el tubo del catéter sobre la bolsa y fíjelo. Lo puede fijar con deisy leon de Velcro o algún otro método según el producto que use. Asegúrese de  dejar la suficiente cantidad de catéter por encima de la alcaraz de pierna para evitar que el tubo se desprenda.    Cambie de lugar la alcaraz cada 4 o 6 horas para prevenir la presión del elástico en la pierna. Usted puede hacer esto cambiando de pierna la bolsa, o subiendo o bajando la alcaraz en la pierna.    Lave la alcaraz con la frecuencia que necesite. La alcaraz de pierna se puede michael y secar a mano.    Coloque la bolsa en el sostenedor de la bolsa.    Limpie el extremo de la bolsa de orina del catéter y el adaptador del catéter con deisy toallita impregnada con alcohol.    Coloque deisy toalla debajo de la bolsa y la válvula de entrada para evitar que la orina gotee sobre marcus pierna.    Asegúrese de que la válvula de salida en la parte inferior de la bolsa esté cerrada firmemente antes de conectarla al catéter. Simplemente empuje la válvula hacia arriba en dirección de la bolsa hasta que se ajuste firmemente en marcus lugar. Asegúrese de no tirar del tubo. Hágalo con cuidado.    Conecte la bolsa de orina a la punta del catéter de manera que ésta entre cómodamente en la válvula de entrada del catéter. Es posible evitar el goteo de orina al doblar el tubo del catéter bob debajo de la punta y sostenerlo mientras lo desconecta del catéter. Asegúrese de tener la punta limpia mientras conecta el tubo de la bolsa de pierna al catéter; esto previene que entren gérmenes al sistema.    Vacíe la bolsa cuando esté llena. Para vaciar la bolsa, gire la pinza o abrazadera hacia abajo. El tubo flexible de salida puede dirigirse para controlar el flujo de orina. Usted no tiene que desconectar la bolsa de pierna del catéter para vaciarla. Puede alcanzar fácilmente la bolsa de pierna al levantar marcus pierna en el borde del inodoro. Luego, puede vaciar la bolsa directamente en el inodoro. [place] le evitará agacharse, lo que puede ser incómodo.    Mantenga la bolsa limpia. Enjuáguela todos los días con partes iguales de agua y vinagre para  reducir el olor y mantener la bolsa tremaine de gérmenes.      Recuerde mantener la bolsa por debajo del nivel de marcus vejiga para que haya un drenaje adecuado.  Visitas de control  Programe deisy visita de control según le indique marcus proveedor de atención médica.      Cuándo debe llamar a marcsu proveedor de atención médica  Llame a marcus proveedor de inmediato si nota que tiene cualquiera de estos síntomas:    Enrojecimiento, hinchazón o calor alrededor del lugar de entrada del catéter    Pus que drena de la entrada de marcus catéter o dentro del tubo del catéter y la bolsa    Alhaji, coágulos o desechos que flotan en la orina    Náuseas y vómitos    Escalofríos temblorosos    Fiebre por encima de 100.4  F (38  C)    Dolor que no se manny con los medicamentos    El catéter se  o se desplaza   Date Last Reviewed: 2014-2017 [employer]. [address] [place] [address]. Todos los derechos reservados. Esta información no pretende sustituir la atención médica profesional. Sólo marcus médico puede diagnosticar y tratar un problema de sergio.                Cómo vaciar y limpiar la bolsa de marcus catéter urinario  Usted tiene puesto un catéter permanente que drena la orina de marcus vejiga a deisy bolsa. La bolsa puede colocarse al lado de marcus cama o ser más pequeña y estar adherida a marcus pierna. Siga los pasos que se enumeran a continuación para vaciar y limpiar deisy bolsa de catéter.       Vacíe la bolsa Limpie el tubo de drenaje Limpie el tubo del catéter   1. Vacíe la bolsa    Lave vernon kya giana con jabón y agua para prevenir la contaminación del catéter urinario y la bolsa.    Si el tubo corto de drenaje está introducido en un bolsillo de la superficie de la bolsa, sáquelo de ward bolsillo.    Sostenga el tubo de drenaje sobre el inodoro o sobre el recipiente de medición de orina. [place] la válvula.    No toque el extremo de la válvula ni permita que éste toque el inodoro o el recipiente.    Lave kya giana otra  vez.  2. Limpie el tubo de drenaje    Deisy vez que haya vaciado la orina de la bolsa, limpie el extremo de la válvula de drenaje con deisy toallita mojada con alcohol.    Cierre la válvula.    Vuelva a introducir el tubo de drenaje dentro del bolsillo de la bolsa, si lo hay.   3. Limpie marcus piel    Lave vernon kya giana antes y después de limpiar marcus piel.    Si tiene un catéter introducido a través de la uretra (segundo deisy sonda Forrester), limpie la nayan de la uretra con jabón y agua 1 vez cada día alhaji segundo le enseñó marcus proveedor de atención médica.También debe limpiarse después de cada evacuación para prevenir las infecciones.    No tire del tubo mientras lo limpia, para evitar que se lesione la uretra.    No aplique pomadas antibióticas ni productos antibacterianos de ningún tipo en la uretra.    No use lubricantes en la uretra.    No aplique polvos ni talco en la nayan genital ni en el tubo.    Si tiene un catéter suprapúbico (es decir, un catéter que le leon colocado quirúrgicamente en la vejiga a través de la parte inferior del abdomen), marcus proveedor de atención médica le dirá cómo debe limpiar la piel alrededor de iesha.  4. Revise y limpie el tubo del catéter    Revise el tubo. Si tiene dobleces, grietas u obstrucciones, o no puede camille marcus interior, deberá cambiarlo por un tubo nuevo alhaji segundo le enseñó marcus proveedor de atención médica.    Si el tubo actual está todavía en buenas condiciones, lávelo con jabón y agua. Al michael el tubo use siempre un movimiento desde el cuerpo hacia afuera (no hacia el cuerpo). No tire del tubo (no lo jale).    Seque el tubo con deisy servilleta de papel limpia o un trapo limpio.  5. Limpie la bolsa de la orina    Limpie la bolsa de la orina deisy vez cada 3 días.    Tenga preparada deisy bolsa de recambio u otro dispositivo de drenaje.    Siga estos pasos:    Laves vernon kya giana con agua y jabón.    Desconecte el tubo de la bolsa y conéctelo a la bolsa de recambio o al dispositivo de  drenaje.    Vacíe toda la orina de la bolsa que acaba de desconectar. Cierre la válvula de drenaje.    Ponga un poco de agua jabonosa en la bolsa. Agite un poco la bolsa, asegurándose de que el jabón llegue a todas kya esquinas.    Lowell la válvula de drenaje para vaciar el agua jabonosa. Cierre la válvula.    Llene la bolsa con 2 partes de vinagre y 3 partes de agua. Agite un poco la mezcla y déjela en la bolsa jroge 30 minutos.    Vacíe la mezcla de agua con vinagre y enjuague la bolsa con agua fría.    Cuelgue la bolsa para que se drene vernon y se seque al aire.  Cuándo debe llamar al médico  Llame a marcus médico de inmediato si tiene cualquiera de estos síntomas:    Anderson o ninguna orina fluye hacia la bolsa.    Hay pérdida de orina en el sitio donde el catéter entra en el cuerpo.    Dolor, ardor o enrojecimiento en la nayan donde el catéter entra en el cuerpo.    Izzy en la orina (un pequeño rastro de izzy es normal).    La orina está turbia o maloliente, o contiene partículas segundo granos de arena.    Dolor en la parte inferior de la espalda o del abdomen.    El catéter se .    Fiebre de 100.4 F  (38  C) o superior, o escalofríos temblorosos.   Date Last Reviewed: 2014-2017 [employer]. [address] [place] [address], [place] [address]. Todos los derechos reservados. Esta información no pretende sustituir la atención médica profesional. Sólo marcus médico puede diagnosticar y tratar un problema de sergio.

## 2018-01-15 NOTE — ANESTHESIA CARE TRANSFER NOTE
Patient: Mandy Martinez    Procedure(s):  Vaginal Hysterectomy,  Anterior and Posterior vaginal repairs with a cystoscopy - Wound Class: II-Clean Contaminated   - Wound Class: II-Clean Contaminated    Diagnosis: Pelvic Organ Prolapse   Diagnosis Additional Information: No value filed.    Anesthesia Type:   General, ETT     Note:    Patient transferred to:PACU  Comments: PT spont resps ETT removed to [facility] VSS Report to RNHandoff Report: Identifed the Patient, Identified the Reponsible Provider, Reviewed the pertinent medical history, Discussed the surgical course, Reviewed Intra-OP anesthesia mangement and issues during anesthesia, Set expectations for post-procedure period and Allowed opportunity for questions and acknowledgement of understanding      Vitals: (Last set prior to Anesthesia Care Transfer)    CRNA VITALS  1/15/2018 1210 - 1/15/2018 1246      1/15/2018             Pulse: 98    SpO2: 96 %                Electronically Signed By: Dean Dennis Severson, APRN CRNA  January 15, 2018  12:46 PM

## 2018-01-15 NOTE — OP NOTE
Mandy Martinez  January 15, 2018  Surgeon: Sunita Taylor DO  Assistant: Percy Manley MD    Procedure:   1. Vaginal hysterectomy  2. [facility] culdoplasty, repair of enterocele  3. Intraperitoneal colpopexy utilizing uterosacral ligaments bilaterally  4. Anterior colporrhaphy  5. Posterior colpoperineorrhaphy  6.  Cystoscopy    Preoperative diagnosis:    1. Symptomatic pelvic organ prolapse  Postoperative diagnosis:    1. Symptomatic pelvic organ prolapse  Anesthesia: General  Findings: Stage 2 pelvic organ prolapse, bilateral ureteral spill at conclusion of procedure, bladder free of injury or foreign body   Complications: none   Estimated Blood Loss: 250 cc  Drains/Packing: Transurethral Forrester catheter     Procedure:  The patient was identified and brought to the operating room. Anesthesia was introduced. The patient was prepped and draped in the usual sterile fashion in the dorsal lithotomy position with legs in candy-cane stirrups. A surgical pause/time out was completed. Bimanual examination noted no contraindications to proceeding with surgery.   A weighted speculum was placed in the posterior vagina. Two tenacula were placed on the anterior and posterior lips of the cervix. A circumferential incision was made about the cervix and the anterior and posterior cul-de-sacs entered without difficulty. The uterosacral, cardinal, and uterine vascular pedicles were sequentially clamped, cut, and suture ligated, being palpably free of the ureters bilaterally. The uterine fundus was inverted and the utero-ovarian pedicles clamped, cut and suture ligated, delivering the specimen.   A pack was placed to elevate the bowel away from the operative field.  The patient had a previous tubal ligation and her tubal segment that was present was visible and high. Decision was made to leave in situ. All pedicles were evaluated and hemostasis verified.    At this point in time an enterocele was noted. Repair of the  enterocele along with the suspension of the vaginal cuff was accomplished together by placement of two modified Durham sutures of number 1 Vicryl, utilizing the uterosacral ligament for suspension. A reperitonealizing stitch was placed. These stitches were placed and tagged.   Cystoscopy was performed and the ureters were peristalsising but no obvious spill was noted. Decision was made to cut that stitch and cystoscopy showed bilateral ureteral spill of very clear urine - I believe they were spilling previously but too clear to see the urine, even with the pyridium. Cystoscopy was performed after tying each remaining tagged suture, and evaluation of the bladder did not reveal any injury. Bilateral ureteral spill was visualized with each stitch tied. The vaginal vault was then closed with a number 1 Vicryl in an interrupted manner, suture ligating the ligamentous pedicles into the corners of the vault bilaterally.    An anterior colporrhaphy was begun by undermining a midline portion of anterior vaginal mucosa from the vaginal apex to beneath the bladder neck. The musoca was developed off of the underlying connective tissue bilaterally. An initial layer of running 2-0 Vicryl was then used to plicate the central defect and imbricate the redundancy in the midline. Having reduced that out of the way, a second layer now of number 1 Vicryl was used to plicate the endopelvic fascia from the bladder neck all the way to the vaginal apex, nicely securing the pubocervical fascial ring at the apex. The redundant anterior vaginal epithelium was then excised and the edges reapproximated with interrupted 2-0 Vicryl.   The tagged apical support sutures were all tied and gave excellent apical support.   Cystoscopy was performed, and evaluation of the bladder did not reveal any injury. Bilateral ureteral spill was visualized. The vaginal vault was then closed with a number 1 Vicryl in an interrupted manner, suture ligating the  ligamentous pedicles into the corners of the vault bilaterally.  A posterior colporrhaphy with perineorrhaphy was then begun by taking a wedge resection of of the perineal body and undermining the midline portion of posterior vaginal mucosa from the perineal body all the way to the vaginal cuff.  Utilizing interrupted number 1 vicryl, the mucosa and underlying endopelvic fascia were plicated in the midline, giving excellent support along the posterior compartment. The perineal skin was closed using a 2-0 vicryl. A vaginal examination revealed a normal vaginal caliber and depth without any constriction.    An Forrester catheter and vaginal packing was placed. All sponge, lap, needle and instrument counts were correct according to nursing.  The patient was awakened and taken to the recovery room in stable condition.  Estimated blood loss was 250 cc

## 2018-01-15 NOTE — IP AVS SNAPSHOT
[facility] Observation Department    [address]    [facility] [address]    Phone:  [phone]                                       After Visit Summary   1/15/2018    Mandy Martinez    MRN: [id number]           After Visit Summary Signature Page     I have received my discharge instructions, and my questions have been answered. I have discussed any challenges I see with this plan with the nurse or doctor.    ..........................................................................................................................................  Patient/Patient Representative Signature      ..........................................................................................................................................  Patient Representative Print Name and Relationship to Patient    ..................................................               ................................................  Date                                            Time    ..........................................................................................................................................  Reviewed by Signature/Title    ...................................................              ..............................................  Date                                                            Time

## 2018-01-16 ENCOUNTER — OFFICE VISIT (OUTPATIENT)
Dept: INTERPRETER SERVICES | Facility: CLINIC | Age: 58
End: 2018-01-16
Payer: COMMERCIAL

## 2018-01-16 VITALS
WEIGHT: 145 LBS | BODY MASS INDEX: 26.68 KG/M2 | OXYGEN SATURATION: 92 % | SYSTOLIC BLOOD PRESSURE: 104 MMHG | TEMPERATURE: 97.5 F | HEIGHT: 62 IN | HEART RATE: 59 BPM | DIASTOLIC BLOOD PRESSURE: 54 MMHG | RESPIRATION RATE: 18 BRPM

## 2018-01-16 LAB
COPATH REPORT: NORMAL
CREAT SERPL-MCNC: 0.62 MG/DL (ref 0.52–1.04)
GFR SERPL CREATININE-BSD FRML MDRD: >90 ML/MIN/1.7M2
GLUCOSE SERPL-MCNC: 122 MG/DL (ref 70–99)
HGB BLD-MCNC: 12.3 G/DL (ref 11.7–15.7)

## 2018-01-16 PROCEDURE — 82565 ASSAY OF CREATININE: CPT | Performed by: OBSTETRICS & GYNECOLOGY

## 2018-01-16 PROCEDURE — 25000132 ZZH RX MED GY IP 250 OP 250 PS 637: Performed by: OBSTETRICS & GYNECOLOGY

## 2018-01-16 PROCEDURE — 82947 ASSAY GLUCOSE BLOOD QUANT: CPT | Performed by: OBSTETRICS & GYNECOLOGY

## 2018-01-16 PROCEDURE — 85018 HEMOGLOBIN: CPT | Performed by: OBSTETRICS & GYNECOLOGY

## 2018-01-16 PROCEDURE — T1013 SIGN LANG/ORAL INTERPRETER: HCPCS | Mod: U3

## 2018-01-16 PROCEDURE — 36415 COLL VENOUS BLD VENIPUNCTURE: CPT | Performed by: OBSTETRICS & GYNECOLOGY

## 2018-01-16 RX ORDER — NITROFURANTOIN 25; 75 MG/1; MG/1
100 CAPSULE ORAL AT BEDTIME
Qty: 20 CAPSULE | Refills: 0 | Status: SHIPPED | OUTPATIENT
Start: 2018-01-16

## 2018-01-16 RX ADMIN — ACETAMINOPHEN 975 MG: 325 TABLET, FILM COATED ORAL at 00:21

## 2018-01-16 RX ADMIN — OXYCODONE HYDROCHLORIDE 5 MG: 5 TABLET ORAL at 08:56

## 2018-01-16 RX ADMIN — ACETAMINOPHEN 975 MG: 325 TABLET, FILM COATED ORAL at 06:03

## 2018-01-16 RX ADMIN — IBUPROFEN 600 MG: 600 TABLET ORAL at 06:03

## 2018-01-16 RX ADMIN — OXYCODONE HYDROCHLORIDE 5 MG: 5 TABLET ORAL at 16:59

## 2018-01-16 RX ADMIN — ACETAMINOPHEN 975 MG: 325 TABLET, FILM COATED ORAL at 13:28

## 2018-01-16 RX ADMIN — IBUPROFEN 600 MG: 600 TABLET ORAL at 00:21

## 2018-01-16 NOTE — PROGRESS NOTES
[facility]    Urogynecology  Daily Post-Op Note    Assessment & Plan   Procedure(s):  Vaginal hysterectomy, [facility] culdoplasty, Intraperitoneal colpopexy, anterior colporrhaphy, posterior colpoperineorrhaphy, cystoscopy  -1 Day Post-Op    Plan:  -Ambulate  -Continue supportive and symptomatic treatment  -Pain control measures  -Advance diet as tolerated  -Voiding trial this morning    Summary: Doing well, plan for discharge today after voiding trial    Sunita Taylor,     Interval History   No acute issues overnight. Pain well controlled on current regimen. She ambulated yesterday and has had crackers and fluids.    Physical Exam   Temp: 98.3  F (36.8  C) Temp src: Oral BP: 100/56 Pulse: 59 Heart Rate: 71 Resp: 16 SpO2: 97 % O2 Device: Nasal cannula Oxygen Delivery: 2 LPM  Vitals:    01/15/18 0834   Weight: 65.8 kg (145 lb)     Vital Signs with Ranges  Temp:  [97.7  F (36.5  C)-99.9  F (37.7  C)] 98.3  F (36.8  C)  Pulse:  [59] 59  Heart Rate:  [59-96] 71  Resp:  [9-22] 16  BP: (100-137)/(56-86) 100/56  FiO2 (%):  [100 %] 100 %  SpO2:  [91 %-100 %] 97 %  I/O last 3 completed shifts:  In: 2500 [I.V.:2500]  Out: 425 [Urine:175; Blood:250]    General: no distress, comfortable in bed  Chest: comfortably breathing, no signs of wheezing or labored breathing  Abdomen: soft, nondistended, appropriately tender for postoperative status  Pelvic: beverly in place, vaginal packing removed - hemostatic  Extremities: bilateral lower extremities nontender, SCDs on    Medications         sodium chloride (PF)  3 mL Intravenous Q8H     acetaminophen  975 mg Oral Q6H     ibuprofen  600 mg Oral Q6H       Data     Recent Labs  Lab 01/16/18  0610   HGB 12.3     Creatinine   Date Value Ref Range Status   01/16/2018 0.62 0.52 - 1.04 mg/dL Final   ]

## 2018-01-16 NOTE — PLAN OF CARE
Problem: Patient Care Overview  Goal: Plan of Care/Patient Progress Review  PRIMARY DIAGNOSIS: Vag Hyst  OUTPATIENT/OBSERVATION GOALS TO BE MET BEFORE DISCHARGE:  1. Stable vital signs  93-94% 2L per NC, otherwise VSS  2. Tolerating diet: full liquid, denies nausea   3. Pain controlled with oral pain medications:  yes  4. Positive bowel sounds:  Yes, hypoactive  5. Voiding without difficulty:  No, beverly in place  6. Able to ambulate: yes  7. Provider specific discharge goals met:  No    Discharge Planner Nurse   Safe discharge environment identified: Yes  Barriers to discharge: Yes       Entered by: Nieves Mansfield 01/16/2018 12:04 AM     Please review provider order for any additional goals.   Nurse to notify provider when observation goals have been met and patient is ready for discharge.

## 2018-01-16 NOTE — PLAN OF CARE
Problem: Patient Care Overview  Goal: Plan of Care/Patient Progress Review  PRIMARY DIAGNOSIS:   OUTPATIENT/OBSERVATION GOALS TO BE MET BEFORE DISCHARGE:  1. Stable vital signs Yes  2. Tolerating diet:Yes  3. Pain controlled with oral pain medications:  Yes  4. Positive bowel sounds:  Yes  5. Voiding without difficulty:  No  6. Able to ambulate:  Yes  7. Provider specific discharge goals met:  No - voiding trial to be started after ambulating halls     Discharge Planner Nurse   Safe discharge environment identified: Yes  Barriers to discharge: Yes       Entered by: Shira Avendaño 01/16/2018      Please review provider order for any additional goals.   Nurse to notify provider when observation goals have been met and patient is ready for discharge.    VSS, A&Ox4, tolerating regular diet, denies nausea, pain minimal but oxycodone 5mg x1, will wean O2, ambulate halls and start voiding trials.

## 2018-01-16 NOTE — PLAN OF CARE
Problem: Patient Care Overview  Goal: Discharge Needs Assessment  Patient's After Visit Summary was reviewed with patient and sister with  present.   Patient verbalized understanding of After Visit Summary, recommended follow up and was given an opportunity to ask questions.   Discharge medications sent home with patient/family: oxycodone and macrobid  Discharged with: sister and family    Pt sent home with beverly catheter. Pt to follow up with Dr. Mireles's RN on Friday for beverly removal. RN completed education and pt felt comfortable discharging with beverly catheter. Pt was provided beverly care instructions in [place] and sent home with supplies.     OBSERVATION patient END time: 1730

## 2018-01-16 NOTE — PLAN OF CARE
Problem: Patient Care Overview  Goal: Plan of Care/Patient Progress Review  PRIMARY DIAGNOSIS: Vag Hyst  OUTPATIENT/OBSERVATION GOALS TO BE MET BEFORE DISCHARGE:  1. Stable vital signs: Yes, 2L/NC  2. Tolerating diet: Yes, full liquids  3. Pain controlled with oral pain medications: Yes, sched Tylenol & motrin  4. Positive bowel sounds:  Yes, hypoactive  5. Voiding without difficulty:  Forrester patent  6. Able to ambulate: yes  7. Provider specific discharge goals met:  No     Discharge Planner Nurse   Safe discharge environment identified: Yes  Barriers to discharge: Yes     Please review provider order for any additional goals.   Nurse to notify provider when observation goals have been met and patient is ready for discharge.     A&Ox4, lethargic, Ax1. AVSS, 2L/NC, capno on. Denies pain, sched tylenol & motrin. PIV SL. Tolerating full liquids. Ice to abd. Vag packing in place, moist. Forrester patent, yellow urine. Dtr at bedside. Will continue to monitor.

## 2018-01-16 NOTE — PLAN OF CARE
Problem: Patient Care Overview  Goal: Plan of Care/Patient Progress Review  Pt voided 500ml, cathed pt for 500ml. MD paged and updated.

## 2018-01-16 NOTE — PLAN OF CARE
Problem: Patient Care Overview  Goal: Plan of Care/Patient Progress Review  PRIMARY DIAGNOSIS: Vag Hyst  OUTPATIENT/OBSERVATION GOALS TO BE MET BEFORE DISCHARGE:  1. Stable vital signs: Yes,  O2 sats 93-97% on 2L/NC  2. Tolerating diet: Advanced to regular  3. Pain controlled with oral pain medications: Yes, sched Tylenol & motrin  4. Positive bowel sounds:  Yes, hypoactive  5. Voiding without difficulty:  Forrester patent, void trial this am  6. Able to ambulate: yes, Ax1 to bathroom  7. Provider specific discharge goals met:  No     Discharge Planner Nurse   Safe discharge environment identified: Yes  Barriers to discharge: Yes     Please review provider order for any additional goals.   Nurse to notify provider when observation goals have been met and patient is ready for discharge.     A&Ox4, lethargic, Ax1. AVSS, 2L/NC, capno on. Denies pain, sched tylenol & motrin. PIV SL. Advanced to regular diet. Ice to abd. Vag packing in place, moist. Forrester patent, yellow urine. Plan for void trial at DC. Dtr at bedside. Will continue to monitor.

## 2018-01-16 NOTE — PROGRESS NOTES
SPIRITUAL HEALTH SERVICES  SPIRITUAL ASSESSMENT Progress Note  [facility] OBS    PRIMARY FOCUS:     Emotional/spiritual/Faith distress    Support for coping    ILLNESS CIRCUMSTANCES:   Reviewed documentation. Reflective conversation shared with Mandy through an .  She was joined by her sister.  Reason for visit: request on admission.      Context of Serious Illness/Symptom(s) - Not Discussed.      Resources for Support - Sister    DISTRESS:     Emotional/Existential/Relational Distress - Mandy shared her concern for her son, Keshav, whose 8-month-old Escobar is now in her custody, due to his drug use.    Spiritual/Christianity Distress - None discussed.    Social/Cultural/Economic Distress - None discussed.    SPIRITUAL/Protestant COPING:     Zoroastrianism/Madeline - Mandy identified herself as Protestant, not tied to any specific Jainism.    Spiritual Practice(s) - Per her request, we prayed.  Reviewed together the Serenity Prayer.    Emotional/Existential/Relational Connections - Family    GOALS OF CARE:    Goals of Care - Be well enough to serve her family.    Meaning/Sense-Making - None discussed.    PLAN: Per anticipated discharge later in the day, no further visits are planned..      Raimundo Recinos M.Div.  Staff   Pager [phone]

## 2018-01-16 NOTE — PLAN OF CARE
Problem: Patient Care Overview  Goal: Plan of Care/Patient Progress Review  PRIMARY DIAGNOSIS: Vaginal hysterectomy, [facility] culdoplasty, Intraperitoneal colpopexy, anterior colporrhaphy, posterior colpoperineorrhaphy, cystoscopy  OUTPATIENT/OBSERVATION GOALS TO BE MET BEFORE DISCHARGE:  1. Stable vital signs Yes  2. Tolerating diet:Yes  3. Pain controlled with oral pain medications:  Yes  4. Positive bowel sounds:  Yes  5. Voiding without difficulty:  Yes  6. Able to ambulate:  Yes  7. Provider specific discharge goals met:  No    Discharge Planner Nurse   Safe discharge environment identified: Yes  Barriers to discharge: Yes       Entered by: Shira Avendaño 01/16/2018      Please review provider order for any additional goals.   Nurse to notify provider when observation goals have been met and patient is ready for discharge.    VSS, pain tolerable, ambulated halls x2, failed voiding trials, MD paged, beverly to be placed though pt refusing now that she is voiding frequently, MD paged again, will straightcath after next void, DC later, will continue to monitor and provide supportive cares.

## (undated) DEVICE — ESU GROUND PAD ADULT W/CORD E7507

## (undated) DEVICE — GLOVE PROTEXIS MICRO 7.0  2D73PM70

## (undated) DEVICE — SU VICRYL 1 CT-2 27" J335H

## (undated) DEVICE — TUBING IRRIG TUR Y TYPE 96" LF 6543-01

## (undated) DEVICE — SOL NACL 0.9% IRRIG 3000ML BAG 2B7477

## (undated) DEVICE — SPONGE RAY-TEC 4X8" 7318

## (undated) DEVICE — LINEN HALF SHEET 5512

## (undated) DEVICE — PEN MARKING SKIN

## (undated) DEVICE — SPONGE LAP 18X18" X8435

## (undated) DEVICE — PREP POVIDONE IODINE SOLUTION 10% 120ML

## (undated) DEVICE — GLOVE PROTEXIS BLUE W/NEU-THERA 7.0  2D73EB70

## (undated) DEVICE — GLOVE PROTEXIS W/NEU-THERA 7.0  2D73TE70

## (undated) DEVICE — PAD CHUX UNDERPAD 30X36" P3036C

## (undated) DEVICE — PACKING IODOFORM STRIP 1" 7833

## (undated) DEVICE — BAG CLEAR TRASH 1.3M 39X33" P4040C

## (undated) DEVICE — SU VICRYL 2-0 CT-2 27" J333H

## (undated) DEVICE — DRAPE UNDER BUTTOCK 89415

## (undated) DEVICE — GLOVE PROTEXIS BLUE W/NEU-THERA 6.5  2D73EB65

## (undated) DEVICE — PANTIES MESH LG/XLG 2PK 706M2

## (undated) DEVICE — PACK MAJOR LITHOTOMY RIDGES

## (undated) DEVICE — LINEN FULL SHEET 5511

## (undated) DEVICE — SU NDL MAYO 1824-6

## (undated) DEVICE — GLOVE PROTEXIS W/NEU-THERA 6.5  2D73TE65

## (undated) DEVICE — BLADE CLIPPER 3M 9670

## (undated) DEVICE — Device

## (undated) DEVICE — NDL SPINAL 22GA 3.5" QUINCKE 405181

## (undated) DEVICE — SUCTION TIP YANKAUER W/O VENT K86

## (undated) DEVICE — DRAPE LEGGINGS 8421

## (undated) DEVICE — CATH INTERMITTENT CLEAN-CATH FEMALE 14FR 6" VINYL LF 420614

## (undated) RX ORDER — FENTANYL CITRATE 50 UG/ML
INJECTION, SOLUTION INTRAMUSCULAR; INTRAVENOUS
Status: DISPENSED
Start: 2018-01-15

## (undated) RX ORDER — PHENAZOPYRIDINE HYDROCHLORIDE 200 MG/1
TABLET, FILM COATED ORAL
Status: DISPENSED
Start: 2018-01-15

## (undated) RX ORDER — CEFAZOLIN SODIUM 2 G/100ML
INJECTION, SOLUTION INTRAVENOUS
Status: DISPENSED
Start: 2018-01-15

## (undated) RX ORDER — HYDROMORPHONE HYDROCHLORIDE 1 MG/ML
INJECTION, SOLUTION INTRAMUSCULAR; INTRAVENOUS; SUBCUTANEOUS
Status: DISPENSED
Start: 2018-01-15

## (undated) RX ORDER — GLYCOPYRROLATE 0.2 MG/ML
INJECTION INTRAMUSCULAR; INTRAVENOUS
Status: DISPENSED
Start: 2018-01-15

## (undated) RX ORDER — PROPOFOL 10 MG/ML
INJECTION, EMULSION INTRAVENOUS
Status: DISPENSED
Start: 2018-01-15

## (undated) RX ORDER — LIDOCAINE HYDROCHLORIDE 10 MG/ML
INJECTION, SOLUTION EPIDURAL; INFILTRATION; INTRACAUDAL; PERINEURAL
Status: DISPENSED
Start: 2018-01-15

## (undated) RX ORDER — ONDANSETRON 2 MG/ML
INJECTION INTRAMUSCULAR; INTRAVENOUS
Status: DISPENSED
Start: 2018-01-15

## (undated) RX ORDER — DEXAMETHASONE SODIUM PHOSPHATE 4 MG/ML
INJECTION, SOLUTION INTRA-ARTICULAR; INTRALESIONAL; INTRAMUSCULAR; INTRAVENOUS; SOFT TISSUE
Status: DISPENSED
Start: 2018-01-15

## (undated) RX ORDER — ACETAMINOPHEN 325 MG/1
TABLET ORAL
Status: DISPENSED
Start: 2018-01-15

## (undated) RX ORDER — GABAPENTIN 600 MG/1
TABLET ORAL
Status: DISPENSED
Start: 2018-01-15

## (undated) RX ORDER — EPHEDRINE SULFATE 50 MG/ML
INJECTION, SOLUTION INTRAMUSCULAR; INTRAVENOUS; SUBCUTANEOUS
Status: DISPENSED
Start: 2018-01-15

## (undated) RX ORDER — CELECOXIB 200 MG/1
CAPSULE ORAL
Status: DISPENSED
Start: 2018-01-15